# Patient Record
Sex: MALE | Race: WHITE | Employment: OTHER | URBAN - METROPOLITAN AREA
[De-identification: names, ages, dates, MRNs, and addresses within clinical notes are randomized per-mention and may not be internally consistent; named-entity substitution may affect disease eponyms.]

---

## 2017-02-23 DIAGNOSIS — R12 HEARTBURN: ICD-10-CM

## 2017-02-23 RX ORDER — PANTOPRAZOLE SODIUM 40 MG/1
TABLET, DELAYED RELEASE ORAL
Qty: 90 TABLET | Refills: 0 | Status: SHIPPED | OUTPATIENT
Start: 2017-02-23 | End: 2017-06-02

## 2017-04-04 DIAGNOSIS — N52.9 ERECTILE DYSFUNCTION, UNSPECIFIED ERECTILE DYSFUNCTION TYPE: Primary | ICD-10-CM

## 2017-04-04 RX ORDER — SILDENAFIL 100 MG/1
50 TABLET, FILM COATED ORAL DAILY PRN
Qty: 30 TABLET | Refills: 1 | Status: SHIPPED | OUTPATIENT
Start: 2017-04-04 | End: 2018-05-22

## 2017-04-04 NOTE — TELEPHONE ENCOUNTER
Pt is in [place] would like refill on Viagra please sign off on prescription. Thank you. Armida Vallejo LPN

## 2017-04-04 NOTE — TELEPHONE ENCOUNTER
Reason for call:  Medication    1. Medication Name? Viagra   2. Is this request for a refill? Yes  3. What Pharmacy do you use? [facility]   4. Have you contacted your pharmacy? No    5. If yes, when?  (Please note that the turn-around-time for prescriptions is 72 business hours; I am sending your request at this time. SEND TO  [facility]  )  Description: Pt called and states that he needs his Viagra renewed, would like a call back regarding this.   Was an appointment offered for this a call? No   Preferred method for responding to this messageTelephone Call  If we cannot reach you directly, may we leave a detailed response at the number you provided? Yes  Can this message wait until your PCP/Provider returns if not available today? n/a

## 2017-05-15 ENCOUNTER — AMBULATORY - HEALTHEAST (OUTPATIENT)
Dept: NEUROSURGERY | Facility: CLINIC | Age: 62
End: 2017-05-15

## 2017-05-15 DIAGNOSIS — R51.9 HA (HEADACHE): ICD-10-CM

## 2017-05-24 ENCOUNTER — RECORDS - HEALTHEAST (OUTPATIENT)
Dept: ADMINISTRATIVE | Facility: OTHER | Age: 62
End: 2017-05-24

## 2017-05-25 ENCOUNTER — RECORDS - HEALTHEAST (OUTPATIENT)
Dept: ADMINISTRATIVE | Facility: OTHER | Age: 62
End: 2017-05-25

## 2017-06-02 DIAGNOSIS — R12 HEARTBURN: ICD-10-CM

## 2017-06-05 DIAGNOSIS — R12 HEARTBURN: ICD-10-CM

## 2017-06-06 RX ORDER — PANTOPRAZOLE SODIUM 40 MG/1
TABLET, DELAYED RELEASE ORAL
Qty: 90 TABLET | Refills: 0 | Status: SHIPPED | OUTPATIENT
Start: 2017-06-06 | End: 2018-07-20

## 2017-06-06 NOTE — TELEPHONE ENCOUNTER
Protonix         Last Written Prescription Date: 2/23/2017  Last Fill Quantity: 90,  # refills: 0   Last Office Visit with [facility], [facility] or [facility] prescribing provider: 7/18/2016                                         Next 5 appointments (look out 90 days)     Jun 27, 2017 10:20 AM CDT   (Arrive by 10:00 AM)   PHYSICAL with Mohit Otero DO   [facility]    [facility] [place] [address]   [phone]

## 2017-06-07 RX ORDER — PANTOPRAZOLE SODIUM 40 MG/1
TABLET, DELAYED RELEASE ORAL
Qty: 90 TABLET | Refills: 0 | Status: SHIPPED | OUTPATIENT
Start: 2017-06-07 | End: 2017-07-14

## 2017-07-14 ENCOUNTER — OFFICE VISIT (OUTPATIENT)
Dept: PEDIATRICS | Facility: OTHER | Age: 62
End: 2017-07-14
Attending: INTERNAL MEDICINE
Payer: COMMERCIAL

## 2017-07-14 VITALS
BODY MASS INDEX: 27.44 KG/M2 | OXYGEN SATURATION: 96 % | HEIGHT: 71 IN | DIASTOLIC BLOOD PRESSURE: 70 MMHG | RESPIRATION RATE: 19 BRPM | WEIGHT: 196 LBS | HEART RATE: 70 BPM | SYSTOLIC BLOOD PRESSURE: 122 MMHG

## 2017-07-14 DIAGNOSIS — L98.9 SKIN LESION: ICD-10-CM

## 2017-07-14 DIAGNOSIS — Z12.5 SCREENING FOR PROSTATE CANCER: ICD-10-CM

## 2017-07-14 DIAGNOSIS — Z00.00 ROUTINE GENERAL MEDICAL EXAMINATION AT A HEALTH CARE FACILITY: Primary | ICD-10-CM

## 2017-07-14 DIAGNOSIS — E78.5 HYPERLIPIDEMIA LDL GOAL <100: Primary | ICD-10-CM

## 2017-07-14 DIAGNOSIS — E78.2 MIXED HYPERLIPIDEMIA: ICD-10-CM

## 2017-07-14 DIAGNOSIS — I10 ESSENTIAL HYPERTENSION: ICD-10-CM

## 2017-07-14 DIAGNOSIS — I63.20 CEREBROVASCULAR ACCIDENT (CVA) DUE TO OCCLUSION OF PRECEREBRAL ARTERY (H): ICD-10-CM

## 2017-07-14 LAB
ALBUMIN SERPL-MCNC: 3.7 G/DL (ref 3.4–5)
ALP SERPL-CCNC: 57 U/L (ref 40–150)
ALT SERPL W P-5'-P-CCNC: 35 U/L (ref 0–70)
ANION GAP SERPL CALCULATED.3IONS-SCNC: 8 MMOL/L (ref 3–14)
AST SERPL W P-5'-P-CCNC: 23 U/L (ref 0–45)
BILIRUB SERPL-MCNC: 0.6 MG/DL (ref 0.2–1.3)
BUN SERPL-MCNC: 23 MG/DL (ref 7–30)
CALCIUM SERPL-MCNC: 8.6 MG/DL (ref 8.5–10.1)
CHLORIDE SERPL-SCNC: 111 MMOL/L (ref 94–109)
CHOLEST SERPL-MCNC: 135 MG/DL
CO2 SERPL-SCNC: 23 MMOL/L (ref 20–32)
CREAT SERPL-MCNC: 1.11 MG/DL (ref 0.66–1.25)
ERYTHROCYTE [DISTWIDTH] IN BLOOD BY AUTOMATED COUNT: 13.2 % (ref 10–15)
GFR SERPL CREATININE-BSD FRML MDRD: 67 ML/MIN/1.7M2
GLUCOSE SERPL-MCNC: 108 MG/DL (ref 70–99)
HCT VFR BLD AUTO: 41.5 % (ref 40–53)
HDLC SERPL-MCNC: 63 MG/DL
HGB BLD-MCNC: 14.2 G/DL (ref 13.3–17.7)
LDLC SERPL CALC-MCNC: 59 MG/DL
MCH RBC QN AUTO: 31.1 PG (ref 26.5–33)
MCHC RBC AUTO-ENTMCNC: 34.2 G/DL (ref 31.5–36.5)
MCV RBC AUTO: 91 FL (ref 78–100)
NONHDLC SERPL-MCNC: 72 MG/DL
PLATELET # BLD AUTO: 240 10E9/L (ref 150–450)
POTASSIUM SERPL-SCNC: 4.2 MMOL/L (ref 3.4–5.3)
PROT SERPL-MCNC: 7.3 G/DL (ref 6.8–8.8)
PSA SERPL-ACNC: 2.54 UG/L (ref 0–4)
RBC # BLD AUTO: 4.56 10E12/L (ref 4.4–5.9)
SODIUM SERPL-SCNC: 142 MMOL/L (ref 133–144)
TRIGL SERPL-MCNC: 67 MG/DL
WBC # BLD AUTO: 7.1 10E9/L (ref 4–11)

## 2017-07-14 PROCEDURE — 85027 COMPLETE CBC AUTOMATED: CPT | Mod: ZL | Performed by: INTERNAL MEDICINE

## 2017-07-14 PROCEDURE — 80053 COMPREHEN METABOLIC PANEL: CPT | Mod: ZL | Performed by: INTERNAL MEDICINE

## 2017-07-14 PROCEDURE — 99396 PREV VISIT EST AGE 40-64: CPT | Performed by: INTERNAL MEDICINE

## 2017-07-14 PROCEDURE — 80061 LIPID PANEL: CPT | Mod: ZL | Performed by: INTERNAL MEDICINE

## 2017-07-14 PROCEDURE — 36415 COLL VENOUS BLD VENIPUNCTURE: CPT | Mod: ZL | Performed by: INTERNAL MEDICINE

## 2017-07-14 PROCEDURE — G0103 PSA SCREENING: HCPCS | Mod: ZL | Performed by: INTERNAL MEDICINE

## 2017-07-14 ASSESSMENT — PAIN SCALES - GENERAL: PAINLEVEL: NO PAIN (0)

## 2017-07-14 NOTE — PROGRESS NOTES
SUBJECTIVE:   CC: Romario Mcdermott is an 62 year old male who presents for preventative health visit.     Healthy Habits:    Do you get at least three servings of calcium containing foods daily (dairy, green leafy vegetables, etc.)? yes    Amount of exercise or daily activities, outside of work: 2 day(s) per week    Problems taking medications regularly No    Medication side effects: No    Have you had an eye exam in the past two years? yes    Do you see a dentist twice per year? yes    Do you have sleep apnea, excessive snoring or daytime drowsiness?no        Hyperlipidemia Follow-Up      Rate your low fat/cholesterol diet?: good    Taking statin?  Yes, no muscle aches from statin    Other lipid medications/supplements?:  none    Hypertension Follow-up      Outpatient blood pressures are not being checked.    Low Salt Diet: no added salt      Today's PHQ-2 Score:   PHQ-2 ( 1999 Pfizer) 10/17/2013   Q1: Little interest or pleasure in doing things 0   Q2: Feeling down, depressed or hopeless 0   PHQ-2 Score 0       Abuse: Current or Past(Physical, Sexual or Emotional)- No  Do you feel safe in your environment - Yes    Social History   Substance Use Topics     Smoking status: Former Smoker     Packs/day: 0.50     Years: 35.00     Types: Cigarettes     Quit date: 1/5/1998     Smokeless tobacco: Former User     Quit date: 10/1/1998      Comment: quit 1999. no passive expposure     Alcohol use Yes      Comment: monthly         Last PSA:   PSA   Date Value Ref [facility] Status   07/18/2016 1.82 0 - 4 ug/L Final       Reviewed orders with patient. Reviewed health maintenance and updated orders accordingly - Yes  BP Readings from Last 3 Encounters:   07/14/17 122/70   07/18/16 121/75   06/24/16 148/70    Wt Readings from Last 3 Encounters:   07/14/17 196 lb (88.9 kg)   07/18/16 206 lb (93.4 kg)   06/24/16 206 lb 12.8 oz (93.8 kg)                    Reviewed and updated as needed this visit by clinical staff  Tobacco  Allergies   Meds  Med Hx  Surg Hx  Fam Hx  Soc Hx        Reviewed and updated as needed this visit by Provider        Past Medical History:   Diagnosis Date     Acute Lyme disease 10/05/2013    Seronegative, clinic diagnosis     Calculus of kidney 3/7/2013     Chronic kidney disease, unspecified 3/7/2013     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     Esophageal reflux 3/7/2013     Gastro-oesophageal reflux disease      History of hepatitis B      Hyperlipidemia LDL goal < 100      Impotence of organic origin 3/7/2013     Osteoarthrosis, unspecified whether generalized or localized, unspecified site 3/7/2013     Other abnormal glucose 3/7/2013     Other and unspecified hyperlipidemia 3/7/2013     Unspecified cerebral artery occlusion with cerebral infarction 3/7/2013     Unspecified essential hypertension 3/7/2013     Unspecified hearing loss 3/7/2013      Past Surgical History:   Procedure Laterality Date     abdominal hernia repair       cervical fusion  C5  2012    cervical cord compression with myelopathy     COLONOSCOPY  4/15/2013    Procedure: COLONOSCOPY;  COLONOSCOPY ;  Surgeon: Terrence Alfredo MD;  Location: [facility],  no polyps     left leg anterior stabilization  2010    tibial plateau fracture     permanent shunt placement  1989     shunt placement  1985    coccidiomycosis; 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  He denies any itching, but it gets caught up when he shaves  E: NEGATIVE for vision changes or irritation  ENT: NEGATIVE for ear, mouth and throat problems  R: NEGATIVE for significant cough or SOB  CV: NEGATIVE for chest pain, palpitations or peripheral edema  GI: NEGATIVE for nausea,  "abdominal pain, heartburn, or change in bowel habits   male: negative for dysuria, hematuria, decreased urinary stream, erectile dysfunction, urethral discharge  M: NEGATIVE for significant arthralgias or myalgia  N: NEGATIVE for weakness, dizziness or paresthesias  E: NEGATIVE for temperature intolerance, skin/hair changes  H: NEGATIVE for bleeding problems  P: NEGATIVE for changes in mood or affect    OBJECTIVE:   /70  Pulse 70  Resp 19  Ht 5' 10.5\" (1.791 m)  Wt 196 lb (88.9 kg)  SpO2 96%  BMI 27.73 kg/m2  EXAM:  GENERAL: healthy, alert and no distress  EYES: Eyes grossly normal to inspection, PERRL and conjunctivae and sclerae normal  HENT: ear canals and TM's normal, nose and mouth without ulcers or lesions  NECK: no adenopathy, no asymmetry, masses, or scars and thyroid normal to palpation  NECK: no carotid bruits  RESP: lungs clear to auscultation - no rales, rhonchi or wheezes  CV: regular rate and rhythm, normal S1 S2, no S3 or S4, no murmur, click or rub, no peripheral edema and peripheral pulses strong  ABDOMEN: soft, nontender, no hepatosplenomegaly, no masses and bowel sounds normal  ABDOMEN: no palpable or pulsatile masses and no bruits heard   (male): normal male genitalia without lesions or urethral discharge, no hernia  RECTAL (male): normal sphincter tone, no rectal masses, prostate normal size, smooth, nontender without nodules or masses  MS: no gross musculoskeletal defects noted, no edema  SKIN: no suspicious lesions or rashes and macule - left neck raised of 3 mm size  NEURO: Normal strength and tone, mentation intact and speech normal  NEURO: Normal strength and tone, sensory exam grossly normal, mentation intact and cranial nerves 3-12 intact  PSYCH: mentation appears normal, affect normal/bright  LYMPH: normal ant/post cervical, supraclavicular, and axillary nodes    LAbs:    Results for orders placed or performed in visit on 07/14/17   CBC with platelets   Result Value Ref " [facility]    WBC 7.1 4.0 - 11.0 10e9/L    RBC Count 4.56 4.4 - 5.9 10e12/L    Hemoglobin 14.2 13.3 - 17.7 g/dL    Hematocrit 41.5 40.0 - 53.0 %    MCV 91 78 - 100 fl    MCH 31.1 26.5 - 33.0 pg    MCHC 34.2 31.5 - 36.5 g/dL    RDW 13.2 10.0 - 15.0 %    Platelet Count 240 150 - 450 10e9/L   Comprehensive metabolic panel (BMP + Alb, Alk Phos, ALT, AST, Total. Bili, TP)   Result Value Ref [facility]    Sodium 142 133 - 144 mmol/L    Potassium 4.2 3.4 - 5.3 mmol/L    Chloride 111 (H) 94 - 109 mmol/L    Carbon Dioxide 23 20 - 32 mmol/L    Anion Gap 8 3 - 14 mmol/L    Glucose 108 (H) 70 - 99 mg/dL    Urea Nitrogen 23 7 - 30 mg/dL    Creatinine 1.11 0.66 - 1.25 mg/dL    GFR Estimate 67 >60 mL/min/1.7m2    GFR Estimate If Black 81 >60 mL/min/1.7m2    Calcium 8.6 8.5 - 10.1 mg/dL    Bilirubin Total 0.6 0.2 - 1.3 mg/dL    Albumin 3.7 3.4 - 5.0 g/dL    Protein Total 7.3 6.8 - 8.8 g/dL    Alkaline Phosphatase 57 40 - 150 U/L    ALT 35 0 - 70 U/L    AST 23 0 - 45 U/L   PSA, screen   Result Value Ref [facility]    PSA 2.54 0 - 4 ug/L     Recent Labs   Lab Test  07/14/17   0925  07/18/16   1057  08/04/14   1128   CHOL  135  157  158   HDL  63  63  61   LDL  59  76  84   TRIG  67  92  64   CHOLHDLRATIO   --    --   2.6       ASSESSMENT/PLAN:   (Z00.00) Routine general medical examination at a health care facility  (primary encounter diagnosis)  Comment: Patient is up to date on his immunizations, colon cancer screen and his Prostate cancer screening.  Plan:   Repeat colonoscopy in 2023    (E78.2) Mixed hyperlipidemia  Comment: well controled.  Plan:   He will continue Zocor 20 mg.    (I10) Essential hypertension  Comment: at goal.  Plan:   He will continue Vasotec 20 mg daily and ASA 81 mg daily.    (I63.20) Cerebrovascular accident (CVA) due to occlusion of precerebral artery (H)  Comment: Stable.  He has no symptoms.  Plan:   He will continue Vasotec 20 mg daily, ASA 81 mg and Plavix 75 mg daily.    (Z12.5) Screening for prostate  "cancer  Comment: His PSA is normal.  Plan:   Continue annual PSA, screen          COUNSELING:  Reviewed preventive health counseling, as reflected in patient instructions       Regular exercise       Consider Hep C screening for patients born between 1945 and 1965       Colon cancer screening       Prostate cancer screening         reports that he quit smoking about 19 years ago. His smoking use included Cigarettes. He has a 17.50 pack-year smoking history. He quit smokeless tobacco use about 18 years ago.    Estimated body mass index is 27.73 kg/(m^2) as calculated from the following:    Height as of this encounter: 5' 10.5\" (1.791 m).    Weight as of this encounter: 196 lb (88.9 kg).       Counseling Resources:  ATP IV Guidelines  Pooled Cohorts Equation Calculator  FRAX Risk Assessment  ICSI Preventive Guidelines  Dietary Guidelines for Americans, 2010  USDA's MyPlate  ASA Prophylaxis  Lung CA Screening    Mohit Otero, DO, DO  [facility]3] [place]  "

## 2017-07-14 NOTE — NURSING NOTE
"Chief Complaint   Patient presents with     Physical       Initial /70  Pulse 70  Resp 19  Ht 5' 10.5\" (1.791 m)  Wt 196 lb (88.9 kg)  SpO2 96%  BMI 27.73 kg/m2 Estimated body mass index is 27.73 kg/(m^2) as calculated from the following:    Height as of this encounter: 5' 10.5\" (1.791 m).    Weight as of this encounter: 196 lb (88.9 kg).  Medication Reconciliation: complete   Gina Kaye      "

## 2017-07-14 NOTE — MR AVS SNAPSHOT
After Visit Summary   7/14/2017    Romario Mcdermott    MRN: [id number]           Patient Information     Date Of Birth          1955        Visit Information        Provider Department      7/14/2017 2:20 PM Mohit Otero,  [facility]        Today's Diagnoses     Routine general medical examination at a health care facility    -  1    Mixed hyperlipidemia        Essential hypertension        Cerebrovascular accident (CVA) due to occlusion of precerebral artery (H)        Screening for prostate cancer        Skin lesion          Care Instructions      Preventive Health Recommendations  Male Ages 50 - 64    Yearly exam:             See your health care provider every year in order to  o   Review health changes.   o   Discuss preventive care.    o   Review your medicines if your doctor has prescribed any.     Have a cholesterol test every 5 years, or more frequently if you are at risk for high cholesterol/heart disease.     Have a diabetes test (fasting glucose) every three years. If you are at risk for diabetes, you should have this test more often.     Have a colonoscopy at age 50, or have a yearly FIT test (stool test). These exams will check for colon cancer.      Talk with your health care provider about whether or not a prostate cancer screening test (PSA) is right for you.    You should be tested each year for STDs (sexually transmitted diseases), if you re at risk.     Shots: Get a flu shot each year. Get a tetanus shot every 10 years.     Nutrition:    Eat at least 5 servings of fruits and vegetables daily.     Eat whole-grain bread, whole-wheat pasta and brown rice instead of white grains and rice.     Talk to your provider about Calcium and Vitamin D.     Lifestyle    Exercise for at least 150 minutes a week (30 minutes a day, 5 days a week). This will help you control your weight and prevent disease.     Limit alcohol to one drink per day.     No smoking.     Wear  "sunscreen to prevent skin cancer.     See your dentist every six months for an exam and cleaning.     See your eye doctor every 1 to 2 years.            Follow-ups after your visit        Additional Services     DERMATOLOGY REFERRAL       Your provider has referred you to: Dr. Ron    Please be aware that coverage of these services is subject to the terms and limitations of your health insurance plan.  Call member services at your health plan with any benefit or coverage questions.      Please bring the following with you to your appointment:    (1) Any X-Rays, CTs or MRIs which have been performed.  Contact the facility where they were done to arrange for  prior to your scheduled appointment.    (2) List of current medications  (3) This referral request   (4) Any documents/labs given to you for this referral                  Who to contact     If you have questions or need follow up information about today's clinic visit or your schedule please contact [facility] directly at [phone].  Normal or non-critical lab and imaging results will be communicated to you by MyChart, letter or phone within 4 business days after the clinic has received the results. If you do not hear from us within 7 days, please contact the clinic through MyChart or phone. If you have a critical or abnormal lab result, we will notify you by phone as soon as possible.  Submit refill requests through [facility] or call your pharmacy and they will forward the refill request to us. Please allow 3 business days for your refill to be completed.          Additional Information About Your Visit        [facility] Information     [facility] lets you send messages to your doctor, view your test results, renew your prescriptions, schedule appointments and more. To sign up, go to [email or web address] . Click on \"Log in\" on the left side of the screen, which will take you to the Welcome page. Then click on \"Sign up Now\" on the right " "side of the page.     You will be asked to enter the access code listed below, as well as some personal information. Please follow the directions to create your username and password.     Your access code is: [email or web address]  Expires: 10/12/2017  2:55 PM     Your access code will  in 90 days. If you need help or a new code, please call your [facility] clinic or [phone].        Care EveryWhere ID     This is your Care EveryWhere ID. This could be used by other organizations to access your [facility] medical records  [id number]        Your Vitals Were     Pulse Respirations Height Pulse Oximetry BMI (Body Mass Index)       70 19 5' 10.5\" (1.791 m) 96% 27.73 kg/m2        Blood Pressure from Last 3 Encounters:   17 122/70   16 121/75   16 148/70    Weight from Last 3 Encounters:   17 196 lb (88.9 kg)   16 206 lb (93.4 kg)   16 206 lb 12.8 oz (93.8 kg)              We Performed the Following     CBC with platelets     Comprehensive metabolic panel (BMP + Alb, Alk Phos, ALT, AST, Total. Bili, TP)     DERMATOLOGY REFERRAL     PSA, screen        Primary Care Provider Office Phone # Fax #    Mohit Tank AlonsoDO martín [phone]       [facility] [place] [address]  [place] [address]        Equal Access to Services     KRISTIN MILNER AH: Hadii aad ku hadasho Soomaali, waaxda luqadaha, qaybta kaalmada adeegyada, bobbi marrero. So [facility] [phone].    ATENCIÓN: Si habla español, tiene a guillory disposición servicios gratuitos de asistencia lingüística. Llame al [phone].    We comply with applicable federal civil rights laws and [place] laws. We do not discriminate on the basis of race, color, national origin, age, disability sex, sexual orientation or gender identity.            Thank you!     Thank you for choosing [facility] [place]  for your care. Our goal is always to provide you with excellent care. Hearing back from our " patients is one way we can continue to improve our services. Please take a few minutes to complete the written survey that you may receive in the mail after your visit with us. Thank you!             Your Updated Medication List - Protect others around you: Learn how to safely use, store and throw away your medicines at www.disposemymeds.org.          This list is accurate as of: 7/14/17  2:55 PM.  Always use your most recent med list.                   Brand Name Dispense Instructions for use Diagnosis    ASPIRIN LOW DOSE 81 MG EC tablet   Generic drug:  aspirin      Take 81 mg by mouth daily.        clopidogrel 75 MG tablet    PLAVIX    30 tablet    TAKE 1 TABLET BY MOUTH EVERY DAY    Cerebral vascular accident (H)       enalapril 20 MG tablet    VASOTEC    90 tablet    TAKE 1 TABLET(20 MG) BY MOUTH EVERY MORNING    HTN (hypertension)       fish oil-omega-3 fatty acids 1000 MG capsule      Take 1 g by mouth daily.        GLUCOSAMINE-CHONDROITIN PO      1,200 mg daily        loratadine 10 MG tablet    CLARITIN     Take 10 mg by mouth daily        pantoprazole 40 MG EC tablet    PROTONIX    90 tablet    TAKE 1 TABLET BY MOUTH ONCE DAILY 30 TO 60 MINUTES BEFORE A MEAL    Heartburn       sildenafil 100 MG cap/tab    VIAGRA    30 tablet    Take 0.5 tablets (50 mg) by mouth daily as needed    Erectile dysfunction, unspecified erectile dysfunction type       simvastatin 20 MG tablet    ZOCOR    90 tablet    TAKE 1 TABLET BY MOUTH EVERY EVENING    Mixed hyperlipidemia

## 2017-07-26 DIAGNOSIS — I10 HTN (HYPERTENSION): ICD-10-CM

## 2017-07-26 DIAGNOSIS — I63.9 CEREBRAL VASCULAR ACCIDENT (H): ICD-10-CM

## 2017-07-31 RX ORDER — CLOPIDOGREL BISULFATE 75 MG/1
TABLET ORAL
Qty: 30 TABLET | Refills: 11 | Status: SHIPPED | OUTPATIENT
Start: 2017-07-31 | End: 2018-07-20

## 2017-07-31 RX ORDER — ENALAPRIL MALEATE 20 MG/1
TABLET ORAL
Qty: 90 TABLET | Refills: 3 | Status: SHIPPED | OUTPATIENT
Start: 2017-07-31 | End: 2018-07-20

## 2017-08-21 ENCOUNTER — TELEPHONE (OUTPATIENT)
Dept: PEDIATRICS | Facility: OTHER | Age: 62
End: 2017-08-21

## 2017-08-21 DIAGNOSIS — E78.2 MIXED HYPERLIPIDEMIA: ICD-10-CM

## 2017-08-21 DIAGNOSIS — R12 HEARTBURN: ICD-10-CM

## 2017-08-21 RX ORDER — SIMVASTATIN 20 MG
TABLET ORAL
Qty: 90 TABLET | Refills: 2 | Status: SHIPPED | OUTPATIENT
Start: 2017-08-21 | End: 2018-06-13

## 2017-08-21 NOTE — TELEPHONE ENCOUNTER
Reason for call:  Medication    1. Medication Name? simvastatin (ZOCOR) 20 MG tablet  2. Is this request for a refill? Yes  3. What Pharmacy do you use? [facility]  4. Have you contacted your pharmacy? Yes    5. If yes, when?  (Please note that the turn-around-time for prescriptions is 72 business hours; I am sending your request at this time. SEND TO  [facility]  )  Description: Patient states he is need of the medication and will be out of town as of 8-22-17, so he needs them today.  Was an appointment offered for this a call? No   Preferred method for responding to this messageTelephone Call [phone]  If we cannot reach you directly, may we leave a detailed response at the number you provided? Yes  Can this message wait until your PCP/Provider returns if not available today? Yes

## 2017-08-22 RX ORDER — PANTOPRAZOLE SODIUM 40 MG/1
TABLET, DELAYED RELEASE ORAL
Qty: 90 TABLET | Refills: 2 | Status: SHIPPED | OUTPATIENT
Start: 2017-08-22 | End: 2019-07-15

## 2017-09-02 DIAGNOSIS — R12 HEARTBURN: ICD-10-CM

## 2017-09-05 RX ORDER — PANTOPRAZOLE SODIUM 40 MG/1
TABLET, DELAYED RELEASE ORAL
Qty: 90 TABLET | Refills: 1 | Status: SHIPPED | OUTPATIENT
Start: 2017-09-05 | End: 2018-04-04

## 2017-09-05 NOTE — TELEPHONE ENCOUNTER
Pantoprazole      Last Written Prescription Date:  8/22/17  Last Fill Quantity: 90tab,   # refills: 2  Last Office Visit with [facility], [facility] or [facility] prescribing provider: 7/14/17  Future Office visit:       Routing refill request to provider for review/approval because:  Drug not on the [facility], [facility] or  [facility] refill protocol or controlled substance

## 2017-12-27 ENCOUNTER — TRANSFERRED RECORDS (OUTPATIENT)
Dept: HEALTH INFORMATION MANAGEMENT | Facility: HOSPITAL | Age: 62
End: 2017-12-27

## 2017-12-28 ENCOUNTER — TRANSFERRED RECORDS (OUTPATIENT)
Dept: HEALTH INFORMATION MANAGEMENT | Facility: HOSPITAL | Age: 62
End: 2017-12-28

## 2017-12-29 ENCOUNTER — TRANSFERRED RECORDS (OUTPATIENT)
Dept: HEALTH INFORMATION MANAGEMENT | Facility: HOSPITAL | Age: 62
End: 2017-12-29

## 2017-12-30 ENCOUNTER — TRANSFERRED RECORDS (OUTPATIENT)
Dept: HEALTH INFORMATION MANAGEMENT | Facility: HOSPITAL | Age: 62
End: 2017-12-30

## 2017-12-30 LAB
ALT SERPL-CCNC: 15 UNITS/L (ref 6–55)
AST SERPL-CCNC: 13 UNITS/L (ref 5–34)
HBA1C MFR BLD: 5.7 % (ref 4–6)

## 2017-12-31 ENCOUNTER — TRANSFERRED RECORDS (OUTPATIENT)
Dept: HEALTH INFORMATION MANAGEMENT | Facility: HOSPITAL | Age: 62
End: 2017-12-31

## 2017-12-31 LAB
CHOLEST SERPL-MCNC: 122 MG/DL
CREAT SERPL-MCNC: 1.2 MG/DL (ref 0.65–1.25)
GLUCOSE SERPL-MCNC: 111 MG/DL (ref 65–99)
HDLC SERPL-MCNC: 42 MG/DL
LDLC SERPL CALC-MCNC: 68 MG/DL
NONHDLC SERPL-MCNC: 80 MG/DL
POTASSIUM SERPL-SCNC: 4.2 MMOL/L (ref 3.6–5.3)
TRIGL SERPL-MCNC: 59 MG/DL

## 2018-01-02 ENCOUNTER — TELEPHONE (OUTPATIENT)
Dept: FAMILY MEDICINE | Facility: OTHER | Age: 63
End: 2018-01-02

## 2018-01-02 NOTE — TELEPHONE ENCOUNTER
11:44 AM    Reason for Call: Phone Call    Description:  Roamrio called to let Dr. Otero know what has been going on with him. Please call Romario .    Was an appointment offered for this call?   No    Preferred method for responding to this message: [phone]    If we cannot reach you directly, may we leave a detailed response at the number you provided?  Yes      Lisa Monson

## 2018-01-02 NOTE — TELEPHONE ENCOUNTER
Talked with patient. He is down in [facility] and wanted to let us know what was going on for the last two months. 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  He had surgery and then was discharged home but lost feeling in his left arm. They gave him a CT scan and noted he had either new blood or old blood right where that tumor was. They elected to do surgery finally after that again to relieve all of that pressure and took him off of his blood thinners and aspirin and put him on pills for seizures. He sees his neurosurgeon on 1-8-18 and will sign to get the records faxed here as he wants us to know what is going on in case something happens when he comes home. JAZZMINE

## 2018-04-04 DIAGNOSIS — R12 HEARTBURN: ICD-10-CM

## 2018-04-05 RX ORDER — PANTOPRAZOLE SODIUM 40 MG/1
TABLET, DELAYED RELEASE ORAL
Qty: 90 TABLET | Refills: 0 | Status: SHIPPED | OUTPATIENT
Start: 2018-04-05 | End: 2018-07-11

## 2018-06-05 ENCOUNTER — TRANSFERRED RECORDS (OUTPATIENT)
Dept: HEALTH INFORMATION MANAGEMENT | Facility: CLINIC | Age: 63
End: 2018-06-05

## 2018-06-08 ENCOUNTER — TELEPHONE (OUTPATIENT)
Dept: PEDIATRICS | Facility: OTHER | Age: 63
End: 2018-06-08

## 2018-06-08 DIAGNOSIS — I10 ESSENTIAL HYPERTENSION: Primary | ICD-10-CM

## 2018-06-08 DIAGNOSIS — Z12.5 SCREENING FOR PROSTATE CANCER: ICD-10-CM

## 2018-06-08 NOTE — TELEPHONE ENCOUNTER
11:20 AM    Reason for Call: Phone Call    Description: Romario has an appointment on 07/20/2018 for a physical and has questions about what labs he needs. Please call Romario to advise.    Was an appointment offered for this call?   No    Preferred method for responding to this message: [phone]    If we cannot reach you directly, may we leave a detailed response at the number you provided? Yes      Lisa Monson

## 2018-06-17 ENCOUNTER — HEALTH MAINTENANCE LETTER (OUTPATIENT)
Age: 63
End: 2018-06-17

## 2018-06-19 ENCOUNTER — TELEPHONE (OUTPATIENT)
Dept: PEDIATRICS | Facility: OTHER | Age: 63
End: 2018-06-19

## 2018-06-19 DIAGNOSIS — Z12.11 SPECIAL SCREENING FOR MALIGNANT NEOPLASMS, COLON: Primary | ICD-10-CM

## 2018-06-19 NOTE — TELEPHONE ENCOUNTER
Reason for call:  REFERRAL   1. Concern: colonoscopy  2. Have you seen a provider for this concern? no  3. Who? pcp Shanna  4. When? 07/2017  5. What services are you requesting? colonoscopy  Description: patient would like referral for colonoscopy  Was an appointment offered for this a call? Yes  If Yes:  Appointment type 07/20 physical                Date    Preferred method for responding to this message: Telephone Call  What is your phone number ?    If we cannot reach you directly, may we leave a detailed response at the number you provided? Yes  Can this message wait until your PCP/Provider returns if not available today? Not applicable,

## 2018-06-20 ENCOUNTER — TELEPHONE (OUTPATIENT)
Dept: INTERNAL MEDICINE | Facility: OTHER | Age: 63
End: 2018-06-20

## 2018-06-20 NOTE — TELEPHONE ENCOUNTER
10:48 AM    Reason for Call: Phone Call    Description: Romario  Is wondering about if he needs to set up a colonoscopy.  He is confused. He thought Dr. Otero asked him to do one. Please call Gloria    Was an appointment offered for this call   No    Preferred method for responding to this message: [phone]    If we cannot reach you directly, may we leave a detailed response at the number you provided?  Yes      Lisa Monson

## 2018-07-11 DIAGNOSIS — R12 HEARTBURN: ICD-10-CM

## 2018-07-11 RX ORDER — PANTOPRAZOLE SODIUM 40 MG/1
TABLET, DELAYED RELEASE ORAL
Qty: 90 TABLET | Refills: 0 | Status: SHIPPED | OUTPATIENT
Start: 2018-07-11 | End: 2019-07-15 | Stop reason: DRUGHIGH

## 2018-07-11 NOTE — TELEPHONE ENCOUNTER
Protonix  Last Written Prescription Date: 4/5/18  Last Fill Quantity: 90 # of Refills: 0  Last Office Visit: 7/14/17    Lety Broderick LPN

## 2018-07-12 ENCOUNTER — TELEPHONE (OUTPATIENT)
Dept: INTERNAL MEDICINE | Facility: OTHER | Age: 63
End: 2018-07-12

## 2018-07-12 NOTE — TELEPHONE ENCOUNTER
8:43 AM    Reason for Call: Phone Call    Description: Pt called and states that he would like to see if he could get a call back regarding him wanting to get his labs done today for his physical next Friday 07/20/18 or would this be to early.    Was an appointment offered for this call? No  If yes : Appointment type              Date    Preferred method for responding to this message: Telephone Call  What is your phone number ?[phone]    If we cannot reach you directly, may we leave a detailed response at the number you provided? Yes    Can this message wait until your PCP/provider returns, if available today? No, PCP is out     Adrianna Corcoran

## 2018-07-20 ENCOUNTER — OFFICE VISIT (OUTPATIENT)
Dept: PEDIATRICS | Facility: OTHER | Age: 63
End: 2018-07-20
Attending: INTERNAL MEDICINE
Payer: COMMERCIAL

## 2018-07-20 VITALS
BODY MASS INDEX: 27.72 KG/M2 | OXYGEN SATURATION: 98 % | TEMPERATURE: 98.2 F | HEART RATE: 70 BPM | WEIGHT: 198 LBS | HEIGHT: 71 IN | DIASTOLIC BLOOD PRESSURE: 78 MMHG | SYSTOLIC BLOOD PRESSURE: 128 MMHG | RESPIRATION RATE: 19 BRPM

## 2018-07-20 DIAGNOSIS — Z12.5 SCREENING FOR PROSTATE CANCER: ICD-10-CM

## 2018-07-20 DIAGNOSIS — I63.20 CEREBROVASCULAR ACCIDENT (CVA) DUE TO OCCLUSION OF PRECEREBRAL ARTERY (H): ICD-10-CM

## 2018-07-20 DIAGNOSIS — E78.2 MIXED HYPERLIPIDEMIA: ICD-10-CM

## 2018-07-20 DIAGNOSIS — Z00.00 ROUTINE GENERAL MEDICAL EXAMINATION AT A HEALTH CARE FACILITY: ICD-10-CM

## 2018-07-20 DIAGNOSIS — I67.9 CEREBROVASCULAR DISEASE: Primary | ICD-10-CM

## 2018-07-20 DIAGNOSIS — I10 ESSENTIAL HYPERTENSION: ICD-10-CM

## 2018-07-20 DIAGNOSIS — R12 HEARTBURN: ICD-10-CM

## 2018-07-20 LAB
ALBUMIN SERPL-MCNC: 3.9 G/DL (ref 3.4–5)
ALP SERPL-CCNC: 66 U/L (ref 40–150)
ALT SERPL W P-5'-P-CCNC: 43 U/L (ref 0–70)
ANION GAP SERPL CALCULATED.3IONS-SCNC: 7 MMOL/L (ref 3–14)
AST SERPL W P-5'-P-CCNC: 19 U/L (ref 0–45)
BILIRUB SERPL-MCNC: 0.3 MG/DL (ref 0.2–1.3)
BUN SERPL-MCNC: 22 MG/DL (ref 7–30)
CALCIUM SERPL-MCNC: 9 MG/DL (ref 8.5–10.1)
CHLORIDE SERPL-SCNC: 108 MMOL/L (ref 94–109)
CO2 SERPL-SCNC: 28 MMOL/L (ref 20–32)
CREAT SERPL-MCNC: 1.21 MG/DL (ref 0.66–1.25)
ERYTHROCYTE [DISTWIDTH] IN BLOOD BY AUTOMATED COUNT: 12.7 % (ref 10–15)
GFR SERPL CREATININE-BSD FRML MDRD: 60 ML/MIN/1.7M2
GLUCOSE SERPL-MCNC: 86 MG/DL (ref 70–99)
HCT VFR BLD AUTO: 41.8 % (ref 40–53)
HGB BLD-MCNC: 14.4 G/DL (ref 13.3–17.7)
MCH RBC QN AUTO: 31.2 PG (ref 26.5–33)
MCHC RBC AUTO-ENTMCNC: 34.4 G/DL (ref 31.5–36.5)
MCV RBC AUTO: 91 FL (ref 78–100)
PLATELET # BLD AUTO: 233 10E9/L (ref 150–450)
POTASSIUM SERPL-SCNC: 4.2 MMOL/L (ref 3.4–5.3)
PROT SERPL-MCNC: 7.3 G/DL (ref 6.8–8.8)
PSA SERPL-ACNC: 2.83 UG/L (ref 0–4)
RBC # BLD AUTO: 4.61 10E12/L (ref 4.4–5.9)
SODIUM SERPL-SCNC: 143 MMOL/L (ref 133–144)
WBC # BLD AUTO: 8.6 10E9/L (ref 4–11)

## 2018-07-20 PROCEDURE — 80053 COMPREHEN METABOLIC PANEL: CPT | Mod: ZL | Performed by: INTERNAL MEDICINE

## 2018-07-20 PROCEDURE — 85027 COMPLETE CBC AUTOMATED: CPT | Mod: ZL | Performed by: INTERNAL MEDICINE

## 2018-07-20 PROCEDURE — 36415 COLL VENOUS BLD VENIPUNCTURE: CPT | Mod: ZL | Performed by: INTERNAL MEDICINE

## 2018-07-20 PROCEDURE — G0103 PSA SCREENING: HCPCS | Mod: ZL | Performed by: INTERNAL MEDICINE

## 2018-07-20 PROCEDURE — 99396 PREV VISIT EST AGE 40-64: CPT | Performed by: INTERNAL MEDICINE

## 2018-07-20 RX ORDER — CLOPIDOGREL BISULFATE 75 MG/1
75 TABLET ORAL DAILY
Qty: 90 TABLET | Refills: 3 | Status: SHIPPED | OUTPATIENT
Start: 2018-07-20 | End: 2019-07-15

## 2018-07-20 RX ORDER — SIMVASTATIN 20 MG
TABLET ORAL
Qty: 90 TABLET | Refills: 3 | Status: SHIPPED | OUTPATIENT
Start: 2018-07-20 | End: 2019-07-15

## 2018-07-20 RX ORDER — PANTOPRAZOLE SODIUM 40 MG/1
TABLET, DELAYED RELEASE ORAL
Qty: 90 TABLET | Refills: 3 | Status: SHIPPED | OUTPATIENT
Start: 2018-07-20 | End: 2019-07-15 | Stop reason: DRUGHIGH

## 2018-07-20 RX ORDER — ENALAPRIL MALEATE 20 MG/1
TABLET ORAL
Qty: 90 TABLET | Refills: 3 | Status: SHIPPED | OUTPATIENT
Start: 2018-07-20 | End: 2019-07-15

## 2018-07-20 ASSESSMENT — PAIN SCALES - GENERAL: PAINLEVEL: NO PAIN (0)

## 2018-07-20 ASSESSMENT — ANXIETY QUESTIONNAIRES
4. TROUBLE RELAXING: NOT AT ALL
1. FEELING NERVOUS, ANXIOUS, OR ON EDGE: NOT AT ALL
2. NOT BEING ABLE TO STOP OR CONTROL WORRYING: NOT AT ALL
6. BECOMING EASILY ANNOYED OR IRRITABLE: NOT AT ALL
7. FEELING AFRAID AS IF SOMETHING AWFUL MIGHT HAPPEN: NOT AT ALL
GAD7 TOTAL SCORE: 0
5. BEING SO RESTLESS THAT IT IS HARD TO SIT STILL: NOT AT ALL
3. WORRYING TOO MUCH ABOUT DIFFERENT THINGS: NOT AT ALL

## 2018-07-20 NOTE — PATIENT INSTRUCTIONS
Preventive Health Recommendations  Male Ages 50 - 64    Yearly exam:             See your health care provider every year in order to  o   Review health changes.   o   Discuss preventive care.    o   Review your medicines if your doctor has prescribed any.     Have a cholesterol test every 5 years, or more frequently if you are at risk for high cholesterol/heart disease.     Have a diabetes test (fasting glucose) every three years. If you are at risk for diabetes, you should have this test more often.     Have a colonoscopy at age 50, or have a yearly FIT test (stool test). These exams will check for colon cancer.      Talk with your health care provider about whether or not a prostate cancer screening test (PSA) is right for you.    You should be tested each year for STDs (sexually transmitted diseases), if you re at risk.     Shots: Get a flu shot each year. Get a tetanus shot every 10 years.     Nutrition:    Eat at least 5 servings of fruits and vegetables daily.     Eat whole-grain bread, whole-wheat pasta and brown rice instead of white grains and rice.     Get adequate Calcium and Vitamin D.     Lifestyle    Exercise for at least 150 minutes a week (30 minutes a day, 5 days a week). This will help you control your weight and prevent disease.     Limit alcohol to one drink per day.     No smoking.     Wear sunscreen to prevent skin cancer.     See your dentist every six months for an exam and cleaning.     See your eye doctor every 1 to 2 years.

## 2018-07-20 NOTE — MR AVS SNAPSHOT
After Visit Summary   7/20/2018    Romario Mcdermott    MRN: [id number]           Patient Information     Date Of Birth          1955        Visit Information        Provider Department      7/20/2018 2:20 PM Mohit Otero,  [facility]        Today's Diagnoses     Cerebrovascular disease    -  1    Routine general medical examination at a health care facility        Essential hypertension        Mixed hyperlipidemia        Screening for prostate cancer          Care Instructions        Preventive Health Recommendations  Male Ages 50 - 64    Yearly exam:             See your health care provider every year in order to  o   Review health changes.   o   Discuss preventive care.    o   Review your medicines if your doctor has prescribed any.     Have a cholesterol test every 5 years, or more frequently if you are at risk for high cholesterol/heart disease.     Have a diabetes test (fasting glucose) every three years. If you are at risk for diabetes, you should have this test more often.     Have a colonoscopy at age 50, or have a yearly FIT test (stool test). These exams will check for colon cancer.      Talk with your health care provider about whether or not a prostate cancer screening test (PSA) is right for you.    You should be tested each year for STDs (sexually transmitted diseases), if you re at risk.     Shots: Get a flu shot each year. Get a tetanus shot every 10 years.     Nutrition:    Eat at least 5 servings of fruits and vegetables daily.     Eat whole-grain bread, whole-wheat pasta and brown rice instead of white grains and rice.     Get adequate Calcium and Vitamin D.     Lifestyle    Exercise for at least 150 minutes a week (30 minutes a day, 5 days a week). This will help you control your weight and prevent disease.     Limit alcohol to one drink per day.     No smoking.     Wear sunscreen to prevent skin cancer.     See your dentist every six months for an exam  "and cleaning.     See your eye doctor every 1 to 2 years.            Follow-ups after your visit        Who to contact     If you have questions or need follow up information about today's clinic visit or your schedule please contact [facility] directly at [phone].  Normal or non-critical lab and imaging results will be communicated to you by MyChart, letter or phone within 4 business days after the clinic has received the results. If you do not hear from us within 7 days, please contact the clinic through MyChart or phone. If you have a critical or abnormal lab result, we will notify you by phone as soon as possible.  Submit refill requests through [employer] or call your pharmacy and they will forward the refill request to us. Please allow 3 business days for your refill to be completed.          Additional Information About Your Visit        Care EveryWhere ID     This is your Care EveryWhere ID. This could be used by other organizations to access your [place] medical records  [id number]        Your Vitals Were     Pulse Temperature Respirations Height Pulse Oximetry BMI (Body Mass Index)    70 98.2  F (36.8  C) (Tympanic) 19 5' 10.5\" (1.791 m) 98% 28.01 kg/m2       Blood Pressure from Last 3 Encounters:   07/20/18 128/78   07/14/17 122/70   07/18/16 121/75    Weight from Last 3 Encounters:   07/20/18 198 lb (89.8 kg)   07/14/17 196 lb (88.9 kg)   07/18/16 206 lb (93.4 kg)              Today, you had the following     No orders found for display       Primary Care Provider Office Phone # Fax #    Mohit Tank Otero [phone] [address] [facility] [address]        Equal Access to Services     DAVIS MILNER AH: Hadii paul perez Sobeth, waaxda luqadaha, qaybta kaalmada adebobbi woodruff. So [facility] [phone].    ATENCIÓN: Si habla español, tiene a guillory disposición servicios gratuitos de asistencia lingüística. Llame al " [phone].    We comply with applicable federal civil rights laws and [place] laws. We do not discriminate on the basis of race, color, national origin, age, disability, sex, sexual orientation, or gender identity.            Thank you!     Thank you for choosing [facility]  for your care. Our goal is always to provide you with excellent care. Hearing back from our patients is one way we can continue to improve our services. Please take a few minutes to complete the written survey that you may receive in the mail after your visit with us. Thank you!             Your Updated Medication List - Protect others around you: Learn how to safely use, store and throw away your medicines at www.disposemymeds.org.          This list is accurate as of 7/20/18  3:13 PM.  Always use your most recent med list.                   Brand Name Dispense Instructions for use Diagnosis    ASPIRIN LOW DOSE 81 MG EC tablet   Generic drug:  aspirin      Take 81 mg by mouth daily.        clopidogrel 75 MG tablet    PLAVIX    30 tablet    TAKE 1 TABLET BY MOUTH EVERY DAY    Cerebral vascular accident (H)       enalapril 20 MG tablet    VASOTEC    90 tablet    TAKE 1 TABLET(20 MG) BY MOUTH EVERY MORNING    HTN (hypertension)       fish oil-omega-3 fatty acids 1000 MG capsule      Take 1 g by mouth daily.        GLUCOSAMINE-CHONDROITIN PO      1,200 mg daily        loratadine 10 MG tablet    CLARITIN     Take 10 mg by mouth daily        * pantoprazole 40 MG EC tablet    PROTONIX    90 tablet    TAKE 1 TABLET BY MOUTH ONCE DAILY 30 TO 60 MINUTES BEFORE A MEAL    Heartburn       * pantoprazole 40 MG EC tablet    PROTONIX    90 tablet    TAKE 1 TABLET BY MOUTH ONCE DAILY 30 TO 60 MINUTES BEFORE A MEAL    Heartburn       * pantoprazole 40 MG EC tablet    PROTONIX    90 tablet    TAKE 1 TABLET BY MOUTH ONCE DAILY 30 TO 60 MINUTES BEFORE A MEAL    Heartburn       sildenafil 100 MG tablet    VIAGRA    30 tablet    TAKE 1/2 TABLET(50  MG) BY MOUTH DAILY AS NEEDED    Erectile dysfunction, unspecified erectile dysfunction type       simvastatin 20 MG tablet    ZOCOR    90 tablet    TAKE 1 TABLET BY MOUTH EVERY EVENING    Mixed hyperlipidemia       * Notice:  This list has 3 medication(s) that are the same as other medications prescribed for you. Read the directions carefully, and ask your doctor or other care provider to review them with you.

## 2018-07-20 NOTE — NURSING NOTE
"Chief Complaint   Patient presents with     Physical       Initial /78 (BP Location: Right arm, Patient Position: Sitting, Cuff Size: Adult Regular)  Pulse 70  Temp 98.2  F (36.8  C) (Tympanic)  Resp 19  Ht 5' 10.5\" (1.791 m)  Wt 198 lb (89.8 kg)  SpO2 98%  BMI 28.01 kg/m2 Estimated body mass index is 28.01 kg/(m^2) as calculated from the following:    Height as of this encounter: 5' 10.5\" (1.791 m).    Weight as of this encounter: 198 lb (89.8 kg).  Medication Reconciliation: complete    Madhuri Tavares LPN  "

## 2018-07-20 NOTE — PROGRESS NOTES
SUBJECTIVE:   CC: Romario Mcdermott is an 63 year old male who presents for preventative health visit.     Healthy Habits:    Do you get at least three servings of calcium containing foods daily (dairy, green leafy vegetables, etc.)? yes    Amount of exercise or daily activities, outside of work: 2-3 day(s) per week    Problems taking medications regularly No    Medication side effects: No    Have you had an eye exam in the past two years? no    Do you see a dentist twice per year? yes    Do you have sleep apnea, excessive snoring or daytime drowsiness?no       Hyperlipidemia Follow-Up      Rate your low fat/cholesterol diet?: good    Taking statin?  No    Other lipid medications/supplements?:  Fish oil/Omega 3, dose 1000 mg without side effects    Hypertension Follow-up      Outpatient blood pressures are not being checked.    Low Salt Diet: no added salt    BP Readings from Last 6 Encounters:   07/20/18 128/78   07/14/17 122/70   07/18/16 121/75   06/24/16 148/70   06/04/15 116/80   04/22/15 150/70     Today's PHQ-2 Score:   PHQ-2 ( 1999 Pf[employer]) 10/17/2013   Q1: Little interest or pleasure in doing things 0   Q2: Feeling down, depressed or hopeless 0   PHQ-2 Score 0       Abuse: Current or Past(Physical, Sexual or Emotional)- No  Do you feel safe in your environment - Yes    Social History   Substance Use Topics     Smoking status: Former Smoker     Packs/day: 0.50     Years: 35.00     Types: Cigarettes     Quit date: 1/5/1998     Smokeless tobacco: Former User     Quit date: 10/1/1998      Comment: quit 1999. no passive expposure     Alcohol use Yes      Comment: monthly      If you drink alcohol do you typically have >3 drinks per day or >7 drinks per week? Not Applicable                      Last PSA:   PSA   Date Value Ref Range Status   07/20/2018 2.83 0 - 4 ug/L Final     Comment:     Assay Method:  Chemiluminescence using Siemens Vista analyzer       Reviewed orders with patient. 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     Esophageal reflux 3/7/2013     Gastro-oesophageal reflux disease      H/O colonoscopy 04/15/2013    Normal     History of hepatitis B      Hyperlipidemia LDL goal < 100      Impotence of organic origin 3/7/2013     Osteoarthrosis, unspecified whether generalized or localized, unspecified site 3/7/2013     Other abnormal glucose 3/7/2013     Other and unspecified hyperlipidemia 3/7/2013     Unspecified cerebral artery occlusion with cerebral infarction 3/7/2013     Unspecified essential hypertension 3/7/2013     Unspecified hearing loss 3/7/2013      Past Surgical History:   Procedure Laterality Date     abdominal hernia repair       cervical fusion  C5  2012    cervical cord compression with myelopathy     COLONOSCOPY  4/15/2013    Procedure: COLONOSCOPY;  COLONOSCOPY ;  Surgeon: Terrence Alfredo MD;  Location: [facility] OR,  no polyps     left leg anterior stabilization  2010    tibial plateau fracture     permanent shunt placement  1989     shunt placement  1985    coccidiomycosis; 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, 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     subdural hematoma evacution  1989    due to head trauma       ROS:  CONSTITUTIONAL: NEGATIVE for fever, chills, change in weight  CONSTITUTIONAL:POSITIVE  for sweats  INTEGUMENTARY/SKIN: right facial skin lesion  EYES: NEGATIVE for vision changes or irritation  ENT: NEGATIVE for ear, mouth and throat problems  RESP: NEGATIVE for significant cough or SOB  CV: NEGATIVE for chest pain, palpitations or peripheral  "edema  GI: NEGATIVE for nausea, abdominal pain, heartburn, or change in bowel habits   male: negative for dysuria, hematuria, decreased urinary stream, erectile dysfunction, urethral discharge.  Positive: GERD which acts up when off PPI   male: Self catheterization   MUSCULOSKELETAL: NEGATIVE for significant arthralgias or myalgia  NEURO: NEGATIVE for weakness, dizziness or paresthesias  PSYCHIATRIC: NEGATIVE for changes in mood or affect    OBJECTIVE:   /78 (BP Location: Right arm, Patient Position: Sitting, Cuff Size: Adult Regular)  Pulse 70  Temp 98.2  F (36.8  C) (Tympanic)  Resp 19  Ht 5' 10.5\" (1.791 m)  Wt 198 lb (89.8 kg)  SpO2 98%  BMI 28.01 kg/m2  EXAM:  GENERAL: healthy, alert and no distress  EYES: Eyes grossly normal to inspection, PERRL and conjunctivae and sclerae normal  EYES: conjunctivae and sclerae normal  HENT: ear canals and TM's normal, nose and mouth without ulcers or lesions  NECK: no adenopathy, no asymmetry, masses, or scars and thyroid normal to palpation  NECK: no carotid bruits  RESP: lungs clear to auscultation - no rales, rhonchi or wheezes  CV: regular rate and rhythm, normal S1 S2, no S3 or S4, no murmur, click or rub, no peripheral edema and peripheral pulses strong  ABDOMEN: soft, nontender, no hepatosplenomegaly, no masses and bowel sounds normal  ABDOMEN: no bruits heard  RECTAL (male): normal sphincter tone, no rectal masses, prostate normal size, smooth, nontender without nodules or masses  MS: no gross musculoskeletal defects noted, no edema  SKIN: no suspicious lesions or rashes and 3 mm skin colored lesion over the right neck  NEURO: Normal strength and tone, mentation intact and speech normal  NEURO: cranial nerves 3-12 intact  PSYCH: mentation appears normal, affect normal/bright  LYMPH: no cervical, supraclavicular, axillary, or inguinal adenopathy    Diagnostic Test Results:  Results for orders placed or performed in visit on 07/20/18   CBC with " platelets   Result Value Ref Range    WBC 8.6 4.0 - 11.0 10e9/L    RBC Count 4.61 4.4 - 5.9 10e12/L    Hemoglobin 14.4 13.3 - 17.7 g/dL    Hematocrit 41.8 40.0 - 53.0 %    MCV 91 78 - 100 fl    MCH 31.2 26.5 - 33.0 pg    MCHC 34.4 31.5 - 36.5 g/dL    RDW 12.7 10.0 - 15.0 %    Platelet Count 233 150 - 450 10e9/L   Comprehensive metabolic panel   Result Value Ref Range    Sodium 143 133 - 144 mmol/L    Potassium 4.2 3.4 - 5.3 mmol/L    Chloride 108 94 - 109 mmol/L    Carbon Dioxide 28 20 - 32 mmol/L    Anion Gap 7 3 - 14 mmol/L    Glucose 86 70 - 99 mg/dL    Urea Nitrogen 22 7 - 30 mg/dL    Creatinine 1.21 0.66 - 1.25 mg/dL    GFR Estimate 60 (L) >60 mL/min/1.7m2    GFR Estimate If Black 73 >60 mL/min/1.7m2    Calcium 9.0 8.5 - 10.1 mg/dL    Bilirubin Total 0.3 0.2 - 1.3 mg/dL    Albumin 3.9 3.4 - 5.0 g/dL    Protein Total 7.3 6.8 - 8.8 g/dL    Alkaline Phosphatase 66 40 - 150 U/L    ALT 43 0 - 70 U/L    AST 19 0 - 45 U/L   PSA, screen   Result Value Ref Range    PSA 2.83 0 - 4 ug/L     Recent Labs   Lab Test 12/31/17 07/14/17   0925   08/04/14   1128   CHOL  122  135   < >  158   HDL  42  63   < >  61   LDL  68  59   < >  84   TRIG  59  67   < >  64   CHOLHDLRATIO   --    --    --   2.6    < > = values in this interval not displayed.       ASSESSMENT/PLAN:   (Z00.00) Routine general medical examination at a health care facility  Comment: His screening for prostate cancer and colon cancer are up to date.  His immunizations are up to date.  Plan:   Repeat colonoscopy in 2023.    (I10) Essential hypertension  Comment: Well cotrolled  Plan:   He will continue enalapril (VASOTEC) 20 MG tablet    (E78.2) Mixed hyperlipidemia  Comment: Well controlled.  Plan:   simvastatin (ZOCOR) 20 MG tablet    (I67.9) Cerebrovascular disease  (primary encounter diagnosis)  Comment: Patient has had fungal meningitis and stroke history.  His lipids have been well controlled.  Plan:   He will continue Zocor 20 mg     (I63.20)  "Cerebrovascular accident (CVA) due to occlusion of precerebral artery (H)  Comment: Stable without deficit.  His blood counts are normal.  Plan:   Continue clopidogrel (PLAVIX) 75 MG tablet    (Z12.5) Screening for prostate cancer  Comment: His PSA is normal  Plan:   Continue annual PSA screen.    (R12) Heartburn  Comment: The patient has attempted to come off of Protonix with return of his heartburn within one week.  Plan:   He will continue pantoprazole (PROTONIX) 40 MG EC tablet and consider an EGD        COUNSELING:  Reviewed preventive health counseling, as reflected in patient instructions       Regular exercise       Immunizations    Not needed             Aspirin Prophylaxsis       Colon cancer screening       Prostate cancer screening    BP Readings from Last 1 Encounters:   07/20/18 128/78     Estimated body mass index is 28.01 kg/(m^2) as calculated from the following:    Height as of this encounter: 5' 10.5\" (1.791 m).    Weight as of this encounter: 198 lb (89.8 kg).    BP Screening:   Last 3 BP Readings:    BP Readings from Last 3 Encounters:   07/20/18 128/78   07/14/17 122/70   07/18/16 121/75       The following was recommended to the patient:         reports that he quit smoking about 20 years ago. His smoking use included Cigarettes. He has a 17.50 pack-year smoking history. He quit smokeless tobacco use about 19 years ago.      Counseling Resources:  ATP IV Guidelines  Pooled Cohorts Equation Calculator  FRAX Risk Assessment  ICSI Preventive Guidelines  Dietary Guidelines for Americans, 2010  USDA's MyPlate  ASA Prophylaxis  Lung CA Screening    Mohitzuhair Otero DO, DO  [facility] [place]  "

## 2018-07-21 ASSESSMENT — PATIENT HEALTH QUESTIONNAIRE - PHQ9: SUM OF ALL RESPONSES TO PHQ QUESTIONS 1-9: 0

## 2018-07-21 ASSESSMENT — ANXIETY QUESTIONNAIRES: GAD7 TOTAL SCORE: 0

## 2018-12-28 DIAGNOSIS — N52.9 ERECTILE DYSFUNCTION, UNSPECIFIED ERECTILE DYSFUNCTION TYPE: ICD-10-CM

## 2018-12-31 RX ORDER — SILDENAFIL 100 MG/1
TABLET, FILM COATED ORAL
Qty: 30 TABLET | Refills: 1 | Status: SHIPPED | OUTPATIENT
Start: 2018-12-31 | End: 2019-11-24

## 2019-07-12 NOTE — PATIENT INSTRUCTIONS
Preventive Health Recommendations:     See your health care provider every year to    Review health changes.     Discuss preventive care.      Review your medicines if your doctor has prescribed any.      Talk with your health care provider about whether you should have a test to screen for prostate cancer (PSA).    Every 3 years, have a diabetes test (fasting glucose). If you are at risk for diabetes, you should have this test more often.    Every 5 years, have a cholesterol test. Have this test more often if you are at risk for high cholesterol or heart disease.     Every 10 years, have a colonoscopy. Or, have a yearly FIT test (stool test). These exams will check for colon cancer.    Talk to with your health care provider about screening for Abdominal Aortic Aneurysm if you have a family history of AAA or have a history of smoking.    Shots:     Get a flu shot each year.     Get a tetanus shot every 10 years.     Talk to your doctor about your pneumonia vaccines. There are now two you should receive - Pneumovax (PPSV 23) and Prevnar (PCV 13).     Talk to your pharmacist about a shingles vaccine.     Talk to your doctor about the hepatitis B vaccine.  Nutrition:     Eat at least 5 servings of fruits and vegetables each day.     Eat whole-grain bread, whole-wheat pasta and brown rice instead of white grains and rice.     Get adequate Calcium and Vitamin D.   Lifestyle    Exercise for at least 150 minutes a week (30 minutes a day, 5 days a week). This will help you control your weight and prevent disease.     Limit alcohol to one drink per day.     No smoking.     Wear sunscreen to prevent skin cancer.    See your dentist every six months for an exam and cleaning.    See your eye doctor every 1 to 2 years to screen for conditions such as glaucoma, macular degeneration, cataracts, etc.    Personalized Prevention Plan  You are due for the preventive services outlined below.  Your care team is available to assist you  in scheduling these services.  If you have already completed any of these items, please share that information with your care team to update in your medical record.  Health Maintenance Due   Topic Date Due     Zoster (Shingles) Vaccine (2 of 3) 01/05/2014     Pneumococcal Vaccine (1 of 2 - PCV13) 06/05/2019     AORTIC ANEURYSM SCREENING (SYSTEM ASSIGNED)  06/05/2019     Annual Wellness Visit  07/20/2019     FALL RISK ASSESSMENT  07/20/2019     Depression Assessment  07/20/2019

## 2019-07-12 NOTE — PROGRESS NOTES
SUBJECTIVE:   CC: Romario Mcdermott is an 64 year old male who presents for preventive health visit.     Healthy Habits:    Do you get at least three servings of calcium containing foods daily (dairy, green leafy vegetables, etc.)? yes    Amount of exercise or daily activities, outside of work: 2-3 day(s) per week    Problems taking medications regularly No    Medication side effects: No    Have you had an eye exam in the past two years? yes    Do you see a dentist twice per year? yes    Do you have sleep apnea, excessive snoring or daytime drowsiness?yes snoring       Hyperlipidemia Follow-Up      Are you having any of the following symptoms? (Select all that apply)  No complaints of shortness of breath, chest pain or pressure.  No increased sweating or nausea with activity.  No left-sided neck or arm pain.  No complaints of pain in calves when walking 1-2 blocks.    Are you regularly taking any medication or supplement to lower your cholesterol?   Yes Simvastatin     Are you having muscle aches or other side effects that you think could be caused by your cholesterol lowering medication?  No    Recent Labs   Lab Test 12/31/17 07/14/17  0925  08/04/14  1128   CHOL 122 135   < > 158   HDL 42 63   < > 61   LDL 68 59   < > 84   TRIG 59 67   < > 64   CHOLHDLRATIO  --   --   --  2.6    < > = values in this interval not displayed.     Hypertension Follow-up      Do you check your blood pressure regularly outside of the clinic? No     Are you following a low salt diet? Yes    Are your blood pressures ever more than 140 on the top number (systolic) OR more   than 90 on the bottom number (diastolic), for example 140/90? No       BP Readings from Last 6 Encounters:   07/15/19 102/60   07/20/18 128/78   07/14/17 122/70   07/18/16 121/75   06/24/16 148/70   06/04/15 116/80     Wt Readings from Last 5 Encounters:   07/15/19 87 kg (191 lb 12.8 oz)   07/20/18 89.8 kg (198 lb)   07/14/17 88.9 kg (196 lb)   07/18/16 93.4 kg (206 lb)    16 93.8 kg (206 lb 12.8 oz)     Today's PHQ-2 Score:   PHQ-2 (  [employer]) 10/17/2013   Q1: Little interest or pleasure in doing things 0   Q2: Feeling down, depressed or hopeless 0   PHQ-2 Score 0       Abuse: Current or Past(Physical, Sexual or Emotional)- No  Do you feel safe in your environment? Yes    Social History     Tobacco Use     Smoking status: Former Smoker     Packs/day: 0.50     Years: 35.00     Pack years: 17.50     Types: Cigarettes     Last attempt to quit: 1998     Years since quittin.5     Smokeless tobacco: Former User     Quit date: 10/1/1998     Tobacco comment: quit . no passive expposure   Substance Use Topics     Alcohol use: Yes     Comment: monthly     If you drink alcohol do you typically have >3 drinks per day or >7 drinks per week? No                      Last PSA:   PSA   Date Value Ref Range Status   2018 2.83 0 - 4 ug/L Final     Comment:     Assay Method:  Chemiluminescence using Siemens Vista analyzer       He is exercise 2 days per week for 2 hours.      Reviewed orders with patient. Reviewed health maintenance and updated orders accordingly - Yes  BP Readings from Last 3 Encounters:   07/15/19 102/60   18 128/78   17 122/70    Wt Readings from Last 3 Encounters:   07/15/19 87 kg (191 lb 12.8 oz)   18 89.8 kg (198 lb)   17 88.9 kg (196 lb)                    Reviewed and updated as needed this visit by clinical staff  Tobacco  Allergies  Med Hx  Surg Hx  Fam Hx  Soc Hx        Reviewed and updated as needed this visit by Provider        Past Medical History:   Diagnosis Date     Acute Lyme disease 10/05/2013    Seronegative, clinic diagnosis     Calculus of kidney 3/7/2013     Chronic kidney disease, unspecified 3/7/2013     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     Esophageal reflux 3/7/2013     Gastro-oesophageal reflux disease      H/O colonoscopy 04/15/2013    Normal     History of hepatitis B      Hyperlipidemia LDL goal <  100      Impotence of organic origin 3/7/2013     Osteoarthrosis, unspecified whether generalized or localized, unspecified site 3/7/2013     Other abnormal glucose 3/7/2013     Other and unspecified hyperlipidemia 3/7/2013     Unspecified cerebral artery occlusion with cerebral infarction 3/7/2013     Unspecified essential hypertension 3/7/2013     Unspecified hearing loss 3/7/2013      Past Surgical History:   Procedure Laterality Date     abdominal hernia repair      umbilical     BRAIN TUMOR RESECTION  11/2017    fromtal lobe     cervical fusion  C5  2012    cervical cord compression with myelopathy     COLONOSCOPY  4/15/2013    Procedure: COLONOSCOPY;  COLONOSCOPY ;  Surgeon: Terrence Alfredo MD;  Location: [facility],  no polyps     left leg anterior stabilization  2010    tibial plateau fracture     permanent shunt placement  1989     shunt placement  1985    coccidiomycosis; 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, 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     subdural hematoma evacution  1989    due to head trauma       ROS:  CONSTITUTIONAL: NEGATIVE for fever, chills, change in weight  INTEGUMENTARY/SKIN: NEGATIVE for worrisome rashes, moles or lesions  EYES: NEGATIVE for vision changes or irritation  EYES: He has mild cataracts   ENT: NEGATIVE for ear, mouth and throat problems  RESP: NEGATIVE for significant cough or SOB  CV: NEGATIVE for chest pain, palpitations or peripheral edema  GI: NEGATIVE for nausea, abdominal pain, heartburn, or change in bowel habits   male: negative for dysuria, hematuria, decreased urinary stream, erectile dysfunction, urethral discharge  MUSCULOSKELETAL: NEGATIVE for significant arthralgias or myalgia  NEURO: NEGATIVE for weakness, dizziness or paresthesias  HEME/ALLERGY/IMMUNE: Occasional heavy nose bleed that are prolonged.  PSYCHIATRIC:  He reports some irritability over small things such as tripping.  He reports strange dreams.  He denies traumatic dreams.  He denies any sleep  "problems.    OBJECTIVE:   /60 (BP Location: Right arm, Patient Position: Chair, Cuff Size: Adult Large)   Pulse 62   Temp 97  F (36.1  C) (Tympanic)   Ht 1.753 m (5' 9\")   Wt 87 kg (191 lb 12.8 oz)   SpO2 97%   BMI 28.32 kg/m    EXAM:  GENERAL: healthy, alert and no distress  EYES: Eyes grossly normal to inspection, PERRL and conjunctivae and sclerae normal  HENT: ear canals and TM's normal, nose and mouth without ulcers or lesions  HENT: normal cephalic/atraumatic and head with  shunt on the left  NECK: no adenopathy, no asymmetry, masses, or scars and thyroid normal to palpation  NECK: no carotid bruits  RESP: lungs clear to auscultation - no rales, rhonchi or wheezes  CV: regular rate and rhythm, normal S1 S2, no S3 or S4, no murmur, click or rub, no peripheral edema and peripheral pulses strong  ABDOMEN: soft, nontender, no hepatosplenomegaly, no masses and bowel sounds normal   (male): normal male genitalia without lesions or urethral discharge, no hernia  RECTAL: normal sphincter tone, no rectal masses, prostate normal size, smooth, nontender without nodules or masses  MS: no gross musculoskeletal defects noted, no edema  SKIN: no suspicious lesions or rashes  NEURO: Normal strength and tone, mentation intact and speech normal  PSYCH: mentation appears normal, affect normal/bright  LYMPH: no cervical, supraclavicular, axillary, or inguinal adenopathy    Diagnostic Test Results:  Labs reviewed in Epic  Results for orders placed or performed in visit on 07/15/19   Lipid Profile (Chol, Trig, HDL, LDL calc)   Result Value Ref Range    Cholesterol 144 <200 mg/dL    Triglycerides 116 <150 mg/dL    HDL Cholesterol 54 >39 mg/dL    LDL Cholesterol Calculated 67 <100 mg/dL    Non HDL Cholesterol 90 <130 mg/dL   PSA, screen   Result Value Ref Range    PSA 2.20 0 - 4 ug/L   Comprehensive metabolic panel   Result Value Ref Range    Sodium 142 133 - 144 mmol/L    Potassium 4.5 3.4 - 5.3 mmol/L    Chloride " 110 (H) 94 - 109 mmol/L    Carbon Dioxide 27 20 - 32 mmol/L    Anion Gap 5 3 - 14 mmol/L    Glucose 103 (H) 70 - 99 mg/dL    Urea Nitrogen 30 7 - 30 mg/dL    Creatinine 1.29 (H) 0.66 - 1.25 mg/dL    GFR Estimate 58 (L) >60 mL/min/[1.73_m2]    GFR Estimate If Black 67 >60 mL/min/[1.73_m2]    Calcium 9.0 8.5 - 10.1 mg/dL    Bilirubin Total 0.5 0.2 - 1.3 mg/dL    Albumin 3.7 3.4 - 5.0 g/dL    Protein Total 7.2 6.8 - 8.8 g/dL    Alkaline Phosphatase 62 40 - 150 U/L    ALT 36 0 - 70 U/L    AST 23 0 - 45 U/L   CBC with platelets   Result Value Ref Range    WBC 7.9 4.0 - 11.0 10e9/L    RBC Count 4.38 (L) 4.4 - 5.9 10e12/L    Hemoglobin 13.6 13.3 - 17.7 g/dL    Hematocrit 40.2 40.0 - 53.0 %    MCV 92 78 - 100 fl    MCH 31.1 26.5 - 33.0 pg    MCHC 33.8 31.5 - 36.5 g/dL    RDW 12.9 10.0 - 15.0 %    Platelet Count 221 150 - 450 10e9/L   Hemoglobin A1c   Result Value Ref Range    Hemoglobin A1C 5.7 (H) 0 - 5.6 %   Estimated Average Glucose   Result Value Ref Range    Estimated Average Glucose 117 mg/dL       ASSESSMENT/PLAN:   (Z00.00) Routine general medical examination at a health care facility  (primary encounter diagnosis)  Comment: He is up to date on colon and prostate cancer screening.  Plan:   Prevnar 23 today and Prevnar 13 next year.  He will get his Shigrix at [facility].  He is due for his next colonoscopy in 2023.    (I10) Essential hypertension  Comment: Well controlled.  Plan:   He will continue enalapril (VASOTEC) 20 MG tablet, [place] Medicare AAA Screening    (E78.2) Mixed hyperlipidemia  Comment: Well controlled.  Plan:  He will continue simvastatin (ZOCOR) 20 MG tablet      (Z12.5) Special screening for malignant neoplasm of prostate  Comment: He has a normal PSA.  Plan:   Continue annual PSA, screen    (F48.9) Moodiness  Comment: Romario would like to see a psychiatrist in the [place] Area.  Plan:   MENTAL HEALTH REFERRAL  - Adult; Assessments         and Testing; General Psychological Testing;          "Other: Not Listed - Enter Referral Details in         Scheduling Comments Below      (R12) Heartburn  Comment: Romario has persistent GERD.  He will be coming off of Plavix and staying on aspirin.  Plan:  He will continue pantoprazole (PROTONIX) 40 MG EC tablet and I would like him to discontinue the Protonix next March as he will only be on one antiplatelet therapy.     (Z87.891) History of tobacco use disorder  Comment:   Plan:   [place] Medicare AAA Screening    (Z23) Need for vaccination  Comment:   Plan:   ADMIN MEDICARE: Pneumococcal Vaccine () 23           COUNSELING:  Reviewed preventive health counseling, as reflected in patient instructions       Consider AAA screening for ages 65-75 and smoking history       Regular exercise       Immunizations    Vaccinated for: Pneumococcal             Aspirin Prophylaxsis       Colon cancer screening       Prostate cancer screening    Estimated body mass index is 28.32 kg/m  as calculated from the following:    Height as of this encounter: 1.753 m (5' 9\").    Weight as of this encounter: 87 kg (191 lb 12.8 oz).         reports that he quit smoking about 21 years ago. His smoking use included cigarettes. He has a 17.50 pack-year smoking history. He quit smokeless tobacco use about 20 years ago.      Counseling Resources:  ATP IV Guidelines  Pooled Cohorts Equation Calculator  FRAX Risk Assessment  ICSI Preventive Guidelines  Dietary Guidelines for Americans, 2010  USDA's MyPlate  ASA Prophylaxis  Lung CA Screening    Mohit Otero DO, DO  [facility] [place]  "

## 2019-07-15 ENCOUNTER — OFFICE VISIT (OUTPATIENT)
Dept: INTERNAL MEDICINE | Facility: OTHER | Age: 64
End: 2019-07-15
Attending: INTERNAL MEDICINE
Payer: MEDICARE

## 2019-07-15 VITALS
OXYGEN SATURATION: 97 % | BODY MASS INDEX: 28.41 KG/M2 | WEIGHT: 191.8 LBS | SYSTOLIC BLOOD PRESSURE: 102 MMHG | DIASTOLIC BLOOD PRESSURE: 60 MMHG | TEMPERATURE: 97 F | HEART RATE: 62 BPM | HEIGHT: 69 IN

## 2019-07-15 DIAGNOSIS — Z87.891 HISTORY OF TOBACCO USE DISORDER: ICD-10-CM

## 2019-07-15 DIAGNOSIS — Z23 NEED FOR VACCINATION: ICD-10-CM

## 2019-07-15 DIAGNOSIS — Z12.5 SPECIAL SCREENING FOR MALIGNANT NEOPLASM OF PROSTATE: ICD-10-CM

## 2019-07-15 DIAGNOSIS — R45.89 MOODINESS: ICD-10-CM

## 2019-07-15 DIAGNOSIS — Z00.00 ROUTINE GENERAL MEDICAL EXAMINATION AT A HEALTH CARE FACILITY: Primary | ICD-10-CM

## 2019-07-15 DIAGNOSIS — I10 ESSENTIAL HYPERTENSION: ICD-10-CM

## 2019-07-15 DIAGNOSIS — R12 HEARTBURN: ICD-10-CM

## 2019-07-15 DIAGNOSIS — E78.2 MIXED HYPERLIPIDEMIA: ICD-10-CM

## 2019-07-15 LAB
ALBUMIN SERPL-MCNC: 3.7 G/DL (ref 3.4–5)
ALP SERPL-CCNC: 62 U/L (ref 40–150)
ALT SERPL W P-5'-P-CCNC: 36 U/L (ref 0–70)
ANION GAP SERPL CALCULATED.3IONS-SCNC: 5 MMOL/L (ref 3–14)
AST SERPL W P-5'-P-CCNC: 23 U/L (ref 0–45)
BILIRUB SERPL-MCNC: 0.5 MG/DL (ref 0.2–1.3)
BUN SERPL-MCNC: 30 MG/DL (ref 7–30)
CALCIUM SERPL-MCNC: 9 MG/DL (ref 8.5–10.1)
CHLORIDE SERPL-SCNC: 110 MMOL/L (ref 94–109)
CHOLEST SERPL-MCNC: 144 MG/DL
CO2 SERPL-SCNC: 27 MMOL/L (ref 20–32)
CREAT SERPL-MCNC: 1.29 MG/DL (ref 0.66–1.25)
ERYTHROCYTE [DISTWIDTH] IN BLOOD BY AUTOMATED COUNT: 12.9 % (ref 10–15)
EST. AVERAGE GLUCOSE BLD GHB EST-MCNC: 117 MG/DL
GFR SERPL CREATININE-BSD FRML MDRD: 58 ML/MIN/{1.73_M2}
GLUCOSE SERPL-MCNC: 103 MG/DL (ref 70–99)
HBA1C MFR BLD: 5.7 % (ref 0–5.6)
HCT VFR BLD AUTO: 40.2 % (ref 40–53)
HDLC SERPL-MCNC: 54 MG/DL
HGB BLD-MCNC: 13.6 G/DL (ref 13.3–17.7)
LDLC SERPL CALC-MCNC: 67 MG/DL
MCH RBC QN AUTO: 31.1 PG (ref 26.5–33)
MCHC RBC AUTO-ENTMCNC: 33.8 G/DL (ref 31.5–36.5)
MCV RBC AUTO: 92 FL (ref 78–100)
NONHDLC SERPL-MCNC: 90 MG/DL
PLATELET # BLD AUTO: 221 10E9/L (ref 150–450)
POTASSIUM SERPL-SCNC: 4.5 MMOL/L (ref 3.4–5.3)
PROT SERPL-MCNC: 7.2 G/DL (ref 6.8–8.8)
PSA SERPL-ACNC: 2.2 UG/L (ref 0–4)
RBC # BLD AUTO: 4.38 10E12/L (ref 4.4–5.9)
SODIUM SERPL-SCNC: 142 MMOL/L (ref 133–144)
TRIGL SERPL-MCNC: 116 MG/DL
WBC # BLD AUTO: 7.9 10E9/L (ref 4–11)

## 2019-07-15 PROCEDURE — 80053 COMPREHEN METABOLIC PANEL: CPT | Mod: ZL | Performed by: INTERNAL MEDICINE

## 2019-07-15 PROCEDURE — 85027 COMPLETE CBC AUTOMATED: CPT | Mod: ZL | Performed by: INTERNAL MEDICINE

## 2019-07-15 PROCEDURE — G0103 PSA SCREENING: HCPCS | Mod: ZL | Performed by: INTERNAL MEDICINE

## 2019-07-15 PROCEDURE — G0009 ADMIN PNEUMOCOCCAL VACCINE: HCPCS | Performed by: INTERNAL MEDICINE

## 2019-07-15 PROCEDURE — 83036 HEMOGLOBIN GLYCOSYLATED A1C: CPT | Mod: ZL | Performed by: INTERNAL MEDICINE

## 2019-07-15 PROCEDURE — 80061 LIPID PANEL: CPT | Mod: ZL | Performed by: INTERNAL MEDICINE

## 2019-07-15 PROCEDURE — 36415 COLL VENOUS BLD VENIPUNCTURE: CPT | Mod: ZL | Performed by: INTERNAL MEDICINE

## 2019-07-15 PROCEDURE — 99397 PER PM REEVAL EST PAT 65+ YR: CPT | Performed by: INTERNAL MEDICINE

## 2019-07-15 PROCEDURE — G0463 HOSPITAL OUTPT CLINIC VISIT: HCPCS

## 2019-07-15 PROCEDURE — 90670 PCV13 VACCINE IM: CPT

## 2019-07-15 PROCEDURE — 40000788 ZZHCL STATISTIC ESTIMATED AVERAGE GLUCOSE: Mod: ZL | Performed by: INTERNAL MEDICINE

## 2019-07-15 RX ORDER — SIMVASTATIN 20 MG
TABLET ORAL
Qty: 90 TABLET | Refills: 3 | Status: SHIPPED | OUTPATIENT
Start: 2019-07-15 | End: 2020-08-06

## 2019-07-15 RX ORDER — PANTOPRAZOLE SODIUM 40 MG/1
TABLET, DELAYED RELEASE ORAL
Qty: 90 TABLET | Refills: 3 | Status: SHIPPED | OUTPATIENT
Start: 2019-07-15 | End: 2020-07-27

## 2019-07-15 RX ORDER — ENALAPRIL MALEATE 20 MG/1
TABLET ORAL
Qty: 90 TABLET | Refills: 3 | Status: SHIPPED | OUTPATIENT
Start: 2019-07-15

## 2019-07-15 ASSESSMENT — MIFFLIN-ST. JEOR: SCORE: 1645.38

## 2019-07-15 ASSESSMENT — PAIN SCALES - GENERAL: PAINLEVEL: NO PAIN (0)

## 2019-07-15 ASSESSMENT — PATIENT HEALTH QUESTIONNAIRE - PHQ9: SUM OF ALL RESPONSES TO PHQ QUESTIONS 1-9: 0

## 2019-07-15 NOTE — NURSING NOTE
"Chief Complaint   Patient presents with     Physical       Initial /60 (BP Location: Right arm, Patient Position: Chair, Cuff Size: Adult Large)   Pulse 62   Temp 97  F (36.1  C) (Tympanic)   Ht 1.753 m (5' 9\")   Wt 87 kg (191 lb 12.8 oz)   SpO2 97%   BMI 28.32 kg/m   Estimated body mass index is 28.32 kg/m  as calculated from the following:    Height as of this encounter: 1.753 m (5' 9\").    Weight as of this encounter: 87 kg (191 lb 12.8 oz).  Medication Reconciliation: complete     Kendell Chapman LPN    "

## 2019-07-19 ENCOUNTER — HOSPITAL ENCOUNTER (OUTPATIENT)
Dept: ULTRASOUND IMAGING | Facility: HOSPITAL | Age: 64
Discharge: HOME | End: 2019-07-19
Attending: INTERNAL MEDICINE | Admitting: INTERNAL MEDICINE
Payer: MEDICARE

## 2019-07-19 DIAGNOSIS — Z87.891 HISTORY OF TOBACCO USE DISORDER: ICD-10-CM

## 2019-07-19 DIAGNOSIS — I10 ESSENTIAL HYPERTENSION: ICD-10-CM

## 2019-07-19 PROCEDURE — 76706 US ABDL AORTA SCREEN AAA: CPT | Mod: TC

## 2019-07-30 DIAGNOSIS — I10 ESSENTIAL HYPERTENSION: ICD-10-CM

## 2019-07-30 RX ORDER — ENALAPRIL MALEATE 20 MG/1
TABLET ORAL
Qty: 90 TABLET | Refills: 3 | Status: SHIPPED | OUTPATIENT
Start: 2019-07-30

## 2019-08-20 ENCOUNTER — TELEPHONE (OUTPATIENT)
Dept: PEDIATRICS | Facility: OTHER | Age: 64
End: 2019-08-20

## 2019-08-20 NOTE — TELEPHONE ENCOUNTER
9:27 AM    Reason for Call: Phone Call    Description:  Romario is having a lot of hip pain and would like to know what he can take for it .Please call Romario to advise    Was an appointment offered for this call? No    Preferred method for responding to this message: [phone]    If we cannot reach you directly, may we leave a detailed response at the number you provided?  Yes        Lisa Monson

## 2019-08-21 DIAGNOSIS — E78.2 MIXED HYPERLIPIDEMIA: ICD-10-CM

## 2019-08-23 RX ORDER — SIMVASTATIN 20 MG
TABLET ORAL
Qty: 90 TABLET | Refills: 0 | OUTPATIENT
Start: 2019-08-23

## 2019-09-11 PROBLEM — G91.9 HYDROCEPHALUS (H): Status: ACTIVE | Noted: 2019-09-11

## 2019-09-11 PROBLEM — B38.4: Status: ACTIVE | Noted: 2019-09-11

## 2019-09-11 PROBLEM — S06.5XAA SUBDURAL HEMATOMA (H): Status: ACTIVE | Noted: 2019-09-11

## 2019-09-11 PROBLEM — R53.83 FATIGUE: Status: ACTIVE | Noted: 2019-09-11

## 2019-09-11 PROBLEM — N28.1 RENAL CYST: Status: ACTIVE | Noted: 2019-09-11

## 2019-09-11 PROBLEM — E78.00 HYPERCHOLESTEREMIA: Status: ACTIVE | Noted: 2019-09-11

## 2019-09-11 PROBLEM — I63.9 CEREBRAL INFARCTION (H): Status: ACTIVE | Noted: 2019-09-11

## 2019-09-11 PROBLEM — R20.0 NUMBNESS: Status: ACTIVE | Noted: 2019-09-11

## 2019-09-11 PROBLEM — K21.9 GERD (GASTROESOPHAGEAL REFLUX DISEASE): Status: ACTIVE | Noted: 2019-09-11

## 2019-11-24 DIAGNOSIS — N52.9 ERECTILE DYSFUNCTION, UNSPECIFIED ERECTILE DYSFUNCTION TYPE: ICD-10-CM

## 2019-11-26 RX ORDER — SILDENAFIL 100 MG/1
TABLET, FILM COATED ORAL
Qty: 30 TABLET | Refills: 0 | Status: SHIPPED | OUTPATIENT
Start: 2019-11-26 | End: 2020-04-27

## 2020-04-25 DIAGNOSIS — N52.9 ERECTILE DYSFUNCTION, UNSPECIFIED ERECTILE DYSFUNCTION TYPE: ICD-10-CM

## 2020-04-27 RX ORDER — SILDENAFIL 100 MG/1
TABLET, FILM COATED ORAL
Qty: 30 TABLET | Refills: 0 | Status: SHIPPED | OUTPATIENT
Start: 2020-04-27 | End: 2020-08-14

## 2020-07-21 NOTE — TELEPHONE ENCOUNTER
What would you recommend?   Chonodrocutaneous Helical Advancement Flap Text: The defect edges were debeveled with a #15 scalpel blade.  Given the location of the defect and the proximity to free margins a chondrocutaneous helical advancement flap was deemed most appropriate.  Using a sterile surgical marker, the appropriate advancement flap was drawn incorporating the defect and placing the expected incisions within the relaxed skin tension lines where possible.    The area thus outlined was incised deep to adipose tissue with a #15 scalpel blade.  The skin margins were undermined to an appropriate distance in all directions utilizing iris scissors.

## 2020-07-22 DIAGNOSIS — R12 HEARTBURN: ICD-10-CM

## 2020-07-23 NOTE — TELEPHONE ENCOUNTER
pantoprazole      Last Written Prescription Date:  7/15/79  Last Fill Quantity: 90,   # refills: 3  Last Office Visit: 7/15/19  Future Office visit:       Routing refill request to provider for review/approval because:

## 2020-07-27 RX ORDER — PANTOPRAZOLE SODIUM 40 MG/1
TABLET, DELAYED RELEASE ORAL
Qty: 90 TABLET | Refills: 3 | Status: SHIPPED | OUTPATIENT
Start: 2020-07-27

## 2020-08-06 DIAGNOSIS — E78.2 MIXED HYPERLIPIDEMIA: ICD-10-CM

## 2020-08-06 RX ORDER — SIMVASTATIN 20 MG
TABLET ORAL
Qty: 90 TABLET | Refills: 3 | Status: SHIPPED | OUTPATIENT
Start: 2020-08-06

## 2020-08-06 NOTE — TELEPHONE ENCOUNTER
simvastatin      Last Written Prescription Date:  7/15/19  Last Fill Quantity: 90,   # refills: 3  Last Office Visit: 7/15/19  Future Office visit:       Routing refill request to provider for review/approval because:

## 2020-08-13 DIAGNOSIS — N52.9 ERECTILE DYSFUNCTION, UNSPECIFIED ERECTILE DYSFUNCTION TYPE: ICD-10-CM

## 2020-08-14 RX ORDER — SILDENAFIL 100 MG/1
TABLET, FILM COATED ORAL
Qty: 90 TABLET | Refills: 3 | Status: SHIPPED | OUTPATIENT
Start: 2020-08-14 | End: 2021-10-14

## 2021-05-25 ENCOUNTER — RECORDS - HEALTHEAST (OUTPATIENT)
Dept: ADMINISTRATIVE | Facility: CLINIC | Age: 66
End: 2021-05-25

## 2021-05-26 ENCOUNTER — RECORDS - HEALTHEAST (OUTPATIENT)
Dept: ADMINISTRATIVE | Facility: CLINIC | Age: 66
End: 2021-05-26

## 2021-05-28 ENCOUNTER — RECORDS - HEALTHEAST (OUTPATIENT)
Dept: ADMINISTRATIVE | Facility: CLINIC | Age: 66
End: 2021-05-28

## 2021-05-30 ENCOUNTER — RECORDS - HEALTHEAST (OUTPATIENT)
Dept: ADMINISTRATIVE | Facility: CLINIC | Age: 66
End: 2021-05-30

## 2021-06-02 ENCOUNTER — RECORDS - HEALTHEAST (OUTPATIENT)
Dept: ADMINISTRATIVE | Facility: CLINIC | Age: 66
End: 2021-06-02

## 2021-06-10 NOTE — PROGRESS NOTES
"Romario was intially seen by Dr. Cleveland on 10/10/2012 for severe cord compression.  Underwent urgent surgery by Dr. Becerra on 10/12/2012 - C45, C56 ACDF, C5 corpectomy.  Last seen on 11/26/2012.  Recieved call from Romario this morning who requested an appt with Dr. Cleveland due to \"some spots\" in his head that Dr. Cleveland\" asked to follow up after his neck surgery\".  Per discussion with Dr. Cleveland - pt should have a work up done by neurology first. There is no need for brain MRI from our perspective.  Marnie Razo, RN, CNRN    "

## 2021-10-13 DIAGNOSIS — N52.9 ERECTILE DYSFUNCTION, UNSPECIFIED ERECTILE DYSFUNCTION TYPE: ICD-10-CM

## 2021-10-14 RX ORDER — SILDENAFIL 100 MG/1
TABLET, FILM COATED ORAL
Qty: 30 TABLET | Refills: 1 | Status: SHIPPED | OUTPATIENT
Start: 2021-10-14

## 2021-10-14 NOTE — TELEPHONE ENCOUNTER
viagra      Last Written Prescription Date:  8/14/2020  Last Fill Quantity: 90,   # refills: 3  Last Office Visit: 7/15/19  Future Office visit:

## 2021-10-15 NOTE — TELEPHONE ENCOUNTER
Attempt # 2  Outcome: Letter sent - max attempts reached   Comment: A letter is being sent to the last known home address with providers that are accepting new patients

## 2024-04-26 NOTE — ADDENDUM NOTE
Addendum Note by Jono Razo RN at 5/22/2017 12:17 PM     Author: Jono Razo RN Service: -- Author Type: Registered Nurse    Filed: 5/22/2017 12:17 PM Encounter Date: 5/15/2017 Status: Signed    : Jono Razo RN (Registered Nurse)    Addended by: JONO RAZO on: 5/22/2017 12:17 PM        Modules accepted: Orders        
back